# Patient Record
Sex: FEMALE
[De-identification: names, ages, dates, MRNs, and addresses within clinical notes are randomized per-mention and may not be internally consistent; named-entity substitution may affect disease eponyms.]

---

## 2018-02-26 ENCOUNTER — HOSPITAL ENCOUNTER (EMERGENCY)
Dept: HOSPITAL 14 - H.ER | Age: 38
Discharge: HOME | End: 2018-02-26
Payer: COMMERCIAL

## 2018-02-26 VITALS — DIASTOLIC BLOOD PRESSURE: 77 MMHG | SYSTOLIC BLOOD PRESSURE: 127 MMHG | RESPIRATION RATE: 16 BRPM | TEMPERATURE: 98.8 F

## 2018-02-26 VITALS — HEART RATE: 124 BPM | OXYGEN SATURATION: 97 %

## 2018-02-26 VITALS — BODY MASS INDEX: 34 KG/M2

## 2018-02-26 DIAGNOSIS — F43.20: ICD-10-CM

## 2018-02-26 DIAGNOSIS — R07.89: Primary | ICD-10-CM

## 2018-02-26 DIAGNOSIS — K58.9: ICD-10-CM

## 2018-02-26 DIAGNOSIS — F41.9: ICD-10-CM

## 2018-02-26 DIAGNOSIS — F41.0: ICD-10-CM

## 2018-02-26 LAB
APTT BLD: 27.7 SECONDS (ref 25.6–37.1)
BASOPHILS # BLD AUTO: 0.1 K/UL (ref 0–0.2)
BASOPHILS NFR BLD: 0.5 % (ref 0–2)
BUN SERPL-MCNC: 10 MG/DL (ref 7–17)
CALCIUM SERPL-MCNC: 9.6 MG/DL (ref 8.4–10.2)
D DIMER: 102 NG/MLDDU (ref 0–230)
EOSINOPHIL # BLD AUTO: 0.1 K/UL (ref 0–0.7)
EOSINOPHIL NFR BLD: 1 % (ref 0–4)
ERYTHROCYTE [DISTWIDTH] IN BLOOD BY AUTOMATED COUNT: 14.1 % (ref 11.5–14.5)
GFR NON-AFRICAN AMERICAN: > 60
HGB BLD-MCNC: 16.2 G/DL (ref 12–16)
INR PPP: 0.9 (ref 0.9–1.2)
LYMPHOCYTES # BLD AUTO: 1.6 K/UL (ref 1–4.3)
LYMPHOCYTES NFR BLD AUTO: 12.9 % (ref 20–40)
MCH RBC QN AUTO: 32.1 PG (ref 27–31)
MCHC RBC AUTO-ENTMCNC: 33.5 G/DL (ref 33–37)
MCV RBC AUTO: 96.1 FL (ref 81–99)
MONOCYTES # BLD: 0.7 K/UL (ref 0–0.8)
MONOCYTES NFR BLD: 5.9 % (ref 0–10)
NEUTROPHILS # BLD: 10 K/UL (ref 1.8–7)
NEUTROPHILS NFR BLD AUTO: 79.7 % (ref 50–75)
NRBC BLD AUTO-RTO: 0.2 % (ref 0–0)
PLATELET # BLD: 423 K/UL (ref 130–400)
PMV BLD AUTO: 9.4 FL (ref 7.2–11.7)
PROTHROMBIN TIME: 10.4 SECONDS (ref 9.8–13.1)
RBC # BLD AUTO: 5.05 MIL/UL (ref 3.8–5.2)
WBC # BLD AUTO: 12.6 K/UL (ref 4.8–10.8)

## 2018-02-26 PROCEDURE — 96360 HYDRATION IV INFUSION INIT: CPT

## 2018-02-26 PROCEDURE — 83992 ASSAY FOR PHENCYCLIDINE: CPT

## 2018-02-26 PROCEDURE — 93005 ELECTROCARDIOGRAM TRACING: CPT

## 2018-02-26 PROCEDURE — 80361 OPIATES 1 OR MORE: CPT

## 2018-02-26 PROCEDURE — 80349 CANNABINOIDS NATURAL: CPT

## 2018-02-26 PROCEDURE — 80346 BENZODIAZEPINES1-12: CPT

## 2018-02-26 PROCEDURE — 81025 URINE PREGNANCY TEST: CPT

## 2018-02-26 PROCEDURE — 85730 THROMBOPLASTIN TIME PARTIAL: CPT

## 2018-02-26 PROCEDURE — 85025 COMPLETE CBC W/AUTO DIFF WBC: CPT

## 2018-02-26 PROCEDURE — 85378 FIBRIN DEGRADE SEMIQUANT: CPT

## 2018-02-26 PROCEDURE — 80358 DRUG SCREENING METHADONE: CPT

## 2018-02-26 PROCEDURE — 80353 DRUG SCREENING COCAINE: CPT

## 2018-02-26 PROCEDURE — 80048 BASIC METABOLIC PNL TOTAL CA: CPT

## 2018-02-26 PROCEDURE — 84484 ASSAY OF TROPONIN QUANT: CPT

## 2018-02-26 PROCEDURE — 80324 DRUG SCREEN AMPHETAMINES 1/2: CPT

## 2018-02-26 PROCEDURE — 80345 DRUG SCREENING BARBITURATES: CPT

## 2018-02-26 PROCEDURE — 85610 PROTHROMBIN TIME: CPT

## 2018-02-26 PROCEDURE — 99284 EMERGENCY DEPT VISIT MOD MDM: CPT

## 2018-02-26 NOTE — ED PDOC
HPI: Chest Pain


Time Seen by Provider: 02/26/18 12:44


Chief Complaint (Nursing): Chest Pain


History Per: Patient


History/Exam Limitations: no limitations


Onset/Duration Of Symptoms: Other (x 2 weeks)


Current Symptoms Are (Timing): Still Present


Additional Complaint(s): 





Ms. Hurst is a 37 year old female who presents to the ED for non-exertional, 

non-pleuritic, not reproducible with movements chest pain associated with 

palpitations for 2 weeks. Patient states chest pain is present "all the time". 

Patient states pain comes from epigastric pain that goes to middle of the chest 

and to the throat. Patient believes its her anxiety that causes her symptoms. 

Patient reports she had phobias and could not get out of the house. Patient has 

chronic diarrhea with irritable bowel syndrome as well as intermittent 

vomitting (usual when having panic attack). Denies fever, syncope, headache, 

weakness or numbness. No SI or HI. Reports she takes klonopin for her anxiety.








PMD: Provider MICHAEL











Past Medical History


Reviewed: Historical Data, Nursing Documentation, Vital Signs


Vital Signs: 


 Last Vital Signs











Temp  97.9 F   02/26/18 12:34


 


Pulse  124 H  02/26/18 16:29


 


Resp  15   02/26/18 15:31


 


BP  143/85   02/26/18 12:34


 


Pulse Ox  97   02/26/18 16:29














- Medical History


PMH: Anxiety


Other PMH: Panic attacks





- Surgical History


Surgical History: Cholecystectomy





- Family History


Family History: States: Unknown Family Hx





- Immunization History


Hx Tetanus Toxoid Vaccination: No


Hx Influenza Vaccination: No


Hx Pneumococcal Vaccination: No





- Allergies


Allergies/Adverse Reactions: 


 Allergies











Allergy/AdvReac Type Severity Reaction Status Date / Time


 


No Known Allergies Allergy   Verified 02/26/18 13:18














OCTAVIANO Risk Score for UA/NSTEMI





- OCTAVIANO Risk Score


Age > 64: NO


3 or more CAD Risk Factors: NO


Known CAD (Stenosis greater than 50%): NO


Aspirin use in past 7 days: NO


Severe Angina: NO


EKG ST changes greater than 0.5mm: NO


Positive Cardiac Marker: NO


OCTAVIANO Score: 0


Risk %: 5%





Wells Criteria for PE





- Wells Criteria for Pulmonary Embolism


Clinical Signs and Symptoms of DVT: No


P.E is #1 Diagnosis, or Equally Likely: No


Heart Rate >100: No


Immobilization at least 3 days;Surgery previous 4 weeks: No


Previous, objectively diagnosed PE or DVT: No


Hemoptysis: No


Malignancy w/treatment within 6 months, or palliative: No


Total Score: 0





Review of Systems


ROS Statement: Except As Marked, All Systems Reviewed And Found Negative


Constitutional: Negative for: Fever, Weakness, Other (numbness)


Cardiovascular: Positive for: Chest Pain, Palpitations


Gastrointestinal: Positive for: Vomiting (intermittent ), Diarrhea (chronic), 

Other


Neurological: Negative for: Headache, Other (syncope)


Psych: Positive for: Anxiety.  Negative for: Suicidal ideation, Other (

homicidal ideation)





Physical Exam





- Reviewed


Nursing Documentation Reviewed: Yes


Vital Signs Reviewed: Yes





- Physical Exam


Appears: Positive for: No Acute Distress.  Negative for: Well ((+): appears 

anxious)


Head Exam: Positive for: ATRAUMATIC, NORMAL INSPECTION, NORMOCEPHALIC


Skin: Positive for: Normal Color, Warm, Dry


Eye Exam: Positive for: Normal appearance, EOMI, PERRL


ENT: Positive for: Normal ENT Inspection


Neck: Positive for: Normal


Cardiovascular/Chest: Positive for: Tachycardia.  Negative for: Regular Rate, 

Rhythm ((+): Regular rate)


Respiratory: Negative for: Accessory Muscle Use, Respiratory Distress


Gastrointestinal/Abdominal: Positive for: Normal Exam, Soft.  Negative for: 

Tenderness


Extremity: Positive for: Normal ROM.  Negative for: Deformity


Neurologic/Psych: Positive for: Alert, CNs II-XII, Oriented (x 3)





- Laboratory Results


Result Diagrams: 


 02/26/18 13:40





 02/26/18 13:40





- ECG


ECG Rhythm: Positive for: Normal QRS, Sinus Tachycardia.  Negative for: Normal 

ST Segment, ST/T Changes


Rate: 124


O2 Sat by Pulse Oximetry: 97 (RA)


Pulse Ox Interpretation: Normal





Medical Decision Making


Medical Decision Making: 





Time: 13:18


Impression(s): Chest Pain, Panic Attacks 


Differentials include, but not limited to: Anxiety Disorder, ACS, PE


Plan:


- EKG


- BMP


- Troponin I


- Crisis Evaluation


- CBC


- D-Dimer


- Partial Thromboplastin Time


- Prothrombin Time


- Ativan 1 mg IV ONCE 


- Sodium Chloride 0.9% 1,000 ml IV 1,000 mls/hr








Labs reviewed. No clinical abnormalities. Patient seen by crisis worker and 

patient is cleared for discharge. Diagnosis is adjustment disorder.











Upon provider evaluation patient is medically stable, and requires no further 

treatment in the ED at this time. Patient will be discharged. Counseling was 

provided and all questions were answered regarding diagnosis. There is 

agreement to discharge plan. Return if symptoms persist or worsen.

















--------------------------------------------------------------------------------

-----------------


Scribe Attestation:


Documented by Mainor Moya, acting as a scribe for Brandon Wilks MD.





Provider Scribe Attestation:


All medical record entries made by the Scribe were at my direction and 

personally dictated by me. I have reviewed the chart and agree that the record 

accurately reflects my personal performance of the history, physical exam, 

medical decision making, and the department course for this patient. I have 

also personally directed, reviewed, and agree with the discharge instructions 

and disposition.








Disposition





- Clinical Impression


Clinical Impression: 


 Chest pain, Anxiety








- Patient ED Disposition


Is Patient to be Admitted: No


Doctor Will See Patient In The: Office


Counseled Patient/Family Regarding: Studies Performed, Diagnosis, Need For 

Followup





- Disposition


Referrals: 


[facility] [Outside]


Disposition: Routine/Home


Disposition Time: 16:20


Condition: GOOD


Additional Instructions: 


Follow up with your PCP in 2-3 days. Return for worsening. Take your 

medications as instructed. 


Instructions:  Chest Pain, Anxiety, Adult (DC)

## 2018-02-26 NOTE — CARD
--------------- APPROVED REPORT --------------





EKG Measurement

Heart [id number]

[id number]

[id number]

[id number]

[id number]



<Conclusion>

Sinus tachycardia

Otherwise normal ECG

## 2022-10-14 ENCOUNTER — NON-APPOINTMENT (OUTPATIENT)
Age: 42
End: 2022-10-14

## 2022-10-14 PROBLEM — Z00.00 ENCOUNTER FOR PREVENTIVE HEALTH EXAMINATION: Status: ACTIVE | Noted: 2022-10-14

## 2022-10-19 ENCOUNTER — APPOINTMENT (OUTPATIENT)
Dept: BARIATRICS | Facility: CLINIC | Age: 42
End: 2022-10-19